# Patient Record
Sex: FEMALE | Race: WHITE
[De-identification: names, ages, dates, MRNs, and addresses within clinical notes are randomized per-mention and may not be internally consistent; named-entity substitution may affect disease eponyms.]

---

## 2020-03-02 ENCOUNTER — HOSPITAL ENCOUNTER (EMERGENCY)
Dept: HOSPITAL 88 - ER | Age: 65
Discharge: HOME | End: 2020-03-02
Payer: MEDICARE

## 2020-03-02 VITALS — HEIGHT: 65 IN | WEIGHT: 238 LBS | BODY MASS INDEX: 39.65 KG/M2

## 2020-03-02 DIAGNOSIS — R30.0: Primary | ICD-10-CM

## 2020-03-02 DIAGNOSIS — R10.32: ICD-10-CM

## 2020-03-02 LAB
ALBUMIN SERPL-MCNC: 3.9 G/DL (ref 3.5–5)
ALBUMIN/GLOB SERPL: 1.3 {RATIO} (ref 0.8–2)
ALP SERPL-CCNC: 68 IU/L (ref 40–150)
ALT SERPL-CCNC: 17 IU/L (ref 0–55)
ANION GAP SERPL CALC-SCNC: 12.1 MMOL/L (ref 8–16)
BACTERIA URNS QL MICRO: (no result) /HPF
BASOPHILS # BLD AUTO: 0 10*3/UL (ref 0–0.1)
BASOPHILS NFR BLD AUTO: 0.5 % (ref 0–1)
BILIRUB UR QL: NEGATIVE
BUN SERPL-MCNC: 23 MG/DL (ref 7–26)
BUN/CREAT SERPL: 21 (ref 6–25)
CALCIUM SERPL-MCNC: 9.7 MG/DL (ref 8.4–10.2)
CHLORIDE SERPL-SCNC: 105 MMOL/L (ref 98–107)
CLARITY UR: CLEAR
CO2 SERPL-SCNC: 26 MMOL/L (ref 22–29)
COLOR UR: YELLOW
DEPRECATED NEUTROPHILS # BLD AUTO: 4.4 10*3/UL (ref 2.1–6.9)
DEPRECATED RBC URNS MANUAL-ACNC: (no result) /HPF (ref 0–5)
EGFRCR SERPLBLD CKD-EPI 2021: 49 ML/MIN (ref 60–?)
EOSINOPHIL # BLD AUTO: 0.2 10*3/UL (ref 0–0.4)
EOSINOPHIL NFR BLD AUTO: 2.4 % (ref 0–6)
EPI CELLS URNS QL MICRO: (no result) /LPF
ERYTHROCYTE [DISTWIDTH] IN CORD BLOOD: 13.5 % (ref 11.7–14.4)
GLOBULIN PLAS-MCNC: 2.9 G/DL (ref 2.3–3.5)
GLUCOSE SERPLBLD-MCNC: 108 MG/DL (ref 74–118)
HCT VFR BLD AUTO: 41.6 % (ref 34.2–44.1)
HGB BLD-MCNC: 13.7 G/DL (ref 12–16)
KETONES UR QL STRIP.AUTO: NEGATIVE
LEUKOCYTE ESTERASE UR QL STRIP.AUTO: NEGATIVE
LYMPHOCYTES # BLD: 2.2 10*3/UL (ref 1–3.2)
LYMPHOCYTES NFR BLD AUTO: 29.8 % (ref 18–39.1)
MCH RBC QN AUTO: 28.9 PG (ref 28–32)
MCHC RBC AUTO-ENTMCNC: 32.9 G/DL (ref 31–35)
MCV RBC AUTO: 87.8 FL (ref 81–99)
MONOCYTES # BLD AUTO: 0.6 10*3/UL (ref 0.2–0.8)
MONOCYTES NFR BLD AUTO: 8.2 % (ref 4.4–11.3)
NEUTS SEG NFR BLD AUTO: 59 % (ref 38.7–80)
NITRITE UR QL STRIP.AUTO: NEGATIVE
PLATELET # BLD AUTO: 261 X10E3/UL (ref 140–360)
POTASSIUM SERPL-SCNC: 4.1 MMOL/L (ref 3.5–5.1)
PROT UR QL STRIP.AUTO: NEGATIVE
RBC # BLD AUTO: 4.74 X10E6/UL (ref 3.6–5.1)
SODIUM SERPL-SCNC: 139 MMOL/L (ref 136–145)
SP GR UR STRIP: 1.02 (ref 1.01–1.02)
UROBILINOGEN UR STRIP-MCNC: 0.2 MG/DL (ref 0.2–1)
WBC #/AREA URNS HPF: (no result) /HPF (ref 0–5)

## 2020-03-02 PROCEDURE — 74177 CT ABD & PELVIS W/CONTRAST: CPT

## 2020-03-02 PROCEDURE — 36415 COLL VENOUS BLD VENIPUNCTURE: CPT

## 2020-03-02 PROCEDURE — 83690 ASSAY OF LIPASE: CPT

## 2020-03-02 PROCEDURE — 80053 COMPREHEN METABOLIC PANEL: CPT

## 2020-03-02 PROCEDURE — 81001 URINALYSIS AUTO W/SCOPE: CPT

## 2020-03-02 PROCEDURE — 99284 EMERGENCY DEPT VISIT MOD MDM: CPT

## 2020-03-02 PROCEDURE — 85025 COMPLETE CBC W/AUTO DIFF WBC: CPT

## 2020-03-02 NOTE — XMS REPORT
Summary of Care

                             Created on: 2019



Gabbie Antoine

External Reference #: [id number]

: 1955

Sex: Female



Demographics







                          Address                   [id number]

[place]  [address]

 

                          Home Phone                [phone]

 

                          Preferred Language        English

 

                          Marital Status            

 

                          Hinduism Affiliation     CHR

 

                          Race                      White

 

                          Ethnic Group              Non-





Author







                          Author                    [facility]

 

                          Organization              [facility]

 

                          Address                   Unknown

 

                          Phone                     Unavailable







Support







                Name            Relationship    Address         Phone

 

                Kamryn Lazo    [facility]            , [place]            [phone]







Care Team Providers







                    Care Team Member Name    Role                Phone

 

                    Rachel Rodriguez    PCP                 [phone]







Reason for Visit

* 





 



                           Reason                    Comments

 

 



                                         YEAST INFECTION 









Encounter Details







                          Care Team                 Description



                     Date                Type                Department  

 

                                        



Sari Banks FNP



[address] [address]



[address]



[place] [address] [phone] (Fax)                      Vaginal irritation (Primary Dx)



                     2019          Office Visit        [facility]  



                                         [facility], [address], [address]  



                                         [address]  



                                         [place] [address] [phone]  







Allergies







                                        Comments



                 Active Allergy     Reactions       Severity        Noted Date 

 

                                        



Colon swells



                     Codeine             Swelling            2018 

 

                                        



"complete memory loss"



                     Gabapentin          Unknown - See       2018 



                                         comments   

 

                                        



Body shock



                     Ibuprofen           Other - See         2018 



                                         comments   

 

                                        



Body shock



                     Nsaids (Non-Steroidal     Other - See         2018 



                           Anti-Inflammatory Drug)     comments   



documented as of this encounter (statuses as of 2019)



Medications







                          End Date                  Status



              Medication     Sig          Dispensed     Refills      Start  



                                         Date  

 

                                                    Active



                 acyclovir 200 mg capsule     Take 200 mg      0                 



                           by mouth                  8  



                                         every 4     



                                         (four) hours.     

 

                                                    Active



                     cloniDINE 0.1 mg tablet     Take 0.1 mg         0   



                                         by mouth 2     



                                         (two) times     



                                         daily.     

 

                                                    Active



                 estradiol 1 mg tablet     Take 1 mg by      0                 



                           mouth daily.              8  

 

                                                    Active



                 hydroxychloroquine 200 mg     Take 200 mg      0                 



                     tablet              by mouth            8  



                                         daily.     

 

                                                    Active



                     lansoprazole 30 mg     Take 15 mg by       0   



                           capsule                   mouth daily.     

 

                                                    Active



                 lisinopril-hydrochlorothi     Take 20-25      0                 



                     azide 20-25 mg per tablet     tablets by          8  



                                         mouth daily.     

 

                                                    Active



                     pregabalin 50 mg capsule     Take 75 mg by       0   



                                         mouth daily.     

 

                                                    Active



                     glucosamine/methylsulfony     Take 1              0   



                           lmeth (GLUCOSAMINE MSM     TAB-CAP/M2 by     



                           ORAL)                     mouth daily.     

 

                                                    Active



                     fluocinonide 0.05 %     Apply  to           0   



                           ointment                  area(s) as     



                                         needed.     

 

                                                    Active



              estradiol (ESTRADIOL     Apply 1 Patch     4 Patch      5              



                     TRANSDERMAL PATCH) 0.05     to skin             8  



                           mg/24 hr patch            weekly.     

 

                                                    Active



              estradiol 1 mg tablet     Take 1 tablet     90 tablet     3            09/10/201  



                           by mouth                  8  



                                         daily.     



documented as of this encounter (statuses as of 2019)



Active Problems







 



                           Problem                   Noted Date

 

 



                           Obesity (BMI 30-39.9)     2018

 

 



                           Morbid obesity with body mass index of 40.0-49.9     2018

 

 



                           Cholecystitis             2018

 

 



                                         Overview:



                                         Added automatically from request for surgery [id number]



documented as of this encounter (statuses as of 2019)



Social History







                                        Date



                 Tobacco Use     Types           Packs/Day       Years Used 

 

                                         



                                         Never Smoker    

 

    



                                         Smokeless Tobacco: Never   



                                         Used   









                    Drinks/Week         oz/Week             Comments



                                         Alcohol Use   

 

                                                            rarely



                                         Yes   









 



                           Sex Assigned at Birth     Date Recorded

 

 



                                         Not on file 









                                        Industry



                           Job Start Date            Occupation 

 

                                        Not on file



                           Not on file               Not on file 









                                        Travel End



                           Travel History            Travel Start 

 





                                         No recent travel history available.



documented as of this encounter



Last Filed Vital Signs







                    Reading             Time Taken          Comments



                                         Vital Sign   

 

                    114/75              2019  3:44 PM CDT     



                                         Blood Pressure   

 

                    58                  2019  3:44 PM CDT     



                                         Pulse   

 

                    -                   -                    



                                         Temperature   

 

                    -                   -                    



                                         Respiratory Rate   

 

                    -                   -                    



                                         Oxygen Saturation   

 

                    -                   -                    



                                         Inhaled Oxygen   



                                         Concentration   

 

                    111.1 kg (245 lb)    2019  3:44 PM CDT     



                                         Weight   

 

                    165.1 cm (5' 5")    2019  3:44 PM CDT     



                                         Height   

 

                    40.77               2019  3:44 PM CDT     



                                         Body Mass Index   



documented in this encounter



Progress Notes

* Sari Banks, KYUNG - 2019  3:45 PM CDT



Formatting of this note might be different from the original.

Cc: 

Chief Complaint 

Patient presents with 

 YEAST INFECTION 



HPI

Gabbie Antoine is a 64 year old female  has a past medical history of Cholelithia
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
y of cervix.

She is here today for possible vaginal infection. About 2 weeks ago she used 3 d
ay monistat for vaginal irritation and states her symptoms improved. She is conc
erned because when she went to [facility] for a UTI she noticed on her la
b results it was positive for Gardnerella vaginal. She wants to make sure she do
es not have a current infection. 

Allergies

Gabbie is allergic to codeine; gabapentin; ibuprofen; and nsaids (non-steroidal a
nti-inflammatory drug).



Medications

Outpatient Medications Prior to Visit 

Medication Sig Dispense Refill 

 estradiol 1 mg tablet Take 1 tablet by mouth daily. 90 tablet 3 

 glucosamine/methylsulfonylmeth (GLUCOSAMINE MSM ORAL) Take 1 TAB-CAP/M2 by m
outh daily.   

 acyclovir 200 mg capsule Take 200 mg by mouth every 4 (four) hours.   

 cloniDINE 0.1 mg tablet Take 0.1 mg by mouth 2 (two) times daily.   

 estradiol 1 mg tablet Take 1 mg by mouth daily.   

 hydroxychloroquine 200 mg tablet Take 200 mg by mouth daily.   

 lansoprazole 30 mg capsule Take 15 mg by mouth daily.   

 lisinopril-hydrochlorothiazide 20-25 mg per tablet Take 20-25 tablets by 
th daily.   

 pregabalin 50 mg capsule Take 75 mg by mouth daily.   

 estradiol (ESTRADIOL TRANSDERMAL PATCH) 0.05 mg/24 hr patch Apply 1 Patch to
skin weekly. 4 Patch 5 

 fluocinonide 0.05 % ointment Apply  to area(s) as needed.   



No facility-administered medications prior to visit.  



Histories

Past Medical History: 

Diagnosis Date 

 Cholelithiasis  

 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  

 Fibromyalgia  

 Hypertension  

 Pap smear abnormality of cervix  

 reports had multiple cryotherapies 



Past Surgical History: 

Procedure Laterality Date 

 C-SEC+POSTPARTUM CARE,PREV C-SEC  ,  

 HYSTERECTOMY   

 LAPAROSCOPIC CHOLECYSTECTOMY N/A 2018 

 Surgeon: Lavell Carmona MD;  Location: [facility] 

 OPEN BLADDER SUSPENSION   

 OK ANESTH,KNEE AREA SURGERY   



Social History 



Socioeconomic History 

 Marital status:  

  Spouse name: Not on file 

 Number of children: Not on file 

 Years of education: Not on file 

 Highest education level: Not on file 

Occupational History 

 Not on file 

Social Needs 

 Financial resource strain: Not on file 

 Food insecurity: 

  Worry: Not on file 

  Inability: Not on file 

 Transportation needs: 

  Medical: Not on file 

  Non-medical: Not on file 

Tobacco Use 

 Smoking status: Never Smoker 

 Smokeless tobacco: Never Used 

Substance and Sexual Activity 

 Alcohol use: Yes 

  Comment: rarely 

 Drug use: No 

 Sexual activity: Not Currently 

  Birth control/protection: Surgical 

Lifestyle 

 Physical activity: 

  Days per week: Not on file 

  Minutes per session: Not on file 

 Stress: Not on file 

Relationships 

 Social connections: 

  Talks on phone: Not on file 

  Gets together: Not on file 

  Attends Synagogue service: Not on file 

  Active member of club or organization: Not on file 

  Attends meetings of clubs or organizations: Not on file 

  Relationship status: Not on file 

 Intimate partner violence: 

  Fear of current or ex partner: Not on file 

  Emotionally abused: Not on file 

  Physically abused: Not on file 

  Forced sexual activity: Not on file 

Other Topics Concern 

 Not on file 

Social History Narrative 

 Not on file 



Family History 

Problem Relation Age of Onset 

 No Significant Medical Problems Father  



Review of Systems 

Constitutional: Negative for chills, fatigue and fever. 

Gastrointestinal: Negative.  

Genitourinary: Negative for dysuria, urgency, hematuria, flank pain, decreased u
rine volume, vaginal bleeding, vaginal discharge, difficulty urinating, vaginal 
pain and pelvic pain. 

Skin: Negative.  



Vital Signs

/75  | Pulse 58  | Ht 5' 5" (1.651 m)  | Wt 245 lb (111.1 kg)  | BMI 40.77
kg/m 



Physical Exam 

Constitutional: She is oriented to person, place, and time. She appears well-dev
eloped and well-nourished. No distress. 

Abdominal: Soft. Bowel sounds are normal. She exhibits no mass. There is no guar
ding. 

Genitourinary: Pelvic exam was performed with patient supine. There is no rash, 
tenderness, lesion or injury on the right labia. There is no rash, tenderness, l
esion or injury on the left labia. No erythema, tenderness or bleeding in the va
ever. No foreign body in the vagina. No signs of injury around the vagina. Vagin
al discharge (very small amount of white discharge) found. 

Neurological: She is alert and oriented to person, place, and time. 

Skin: Skin is warm and dry. Capillary refill takes less than 2 seconds. 

Psychiatric: She has a normal mood and affect. Her behavior is normal. Judgment 
and thought content normal. 

Nursing note and vitals reviewed.



Assessment/Plan

Gabbie was seen today for yeast infection.



Diagnoses and all orders for this visit:



Vaginal irritation

-     GALV ONLY - VAGINAL PATHOGENS BY DNA PROBE; Future

-     GALV ONLY - VAGINAL PATHOGENS BY DNA PROBE



Will await results of affirm swab. She is to make appointment with Dr. Lang
for her refills of estradiol. She is due for WWE 19. 



Appropriate plan of care, desired health behaviors, goals, and medications discu
ssed with patient. 

 

Educational resources and self management tools provided, as applicable. Patient
verbalizes understanding. 

 

As necessary, prescribed mediations and potential adverse reactions/side effects
/medication interactions were discussed with the patient, and the patient will n
otify me if any occur. 

 

Follow up or report to ER if symptoms should progress or worsen.

 

The patient indicates understanding and agrees with POC. 

 

Barriers to adherence: None

 

Ability to manage care: Good

 

AVS printed and given to patient. 

KYUNG Quijano  2019  4:55 PM



This visit did not involve counseling and coordination that comprised more than 
50% of the visit time. 



Electronically signed by Sari Banks FNP at 2019  5:04 PM CDT

documented in this encounter



Plan of Treatment







                          Care Team                 Description



                     Date                Type                Specialty  

 

                                        



Brina Samayoa MD



[address] [address] [address]



[phone] (Fax)                       



                     2019          Office Visit        Obstetrics & Gynecology  









                                        Date/Time



                 Name            Type            Priority        Associated Diagnoses 

 

                                        2019  4:44 PM CDT



                 GALV ONLY - VAGINAL     LAB             Routine         Vaginal irritation 



                                         PATHOGENS BY DNA PROBE    









                                        Order Schedule



                 Name            Type            Priority        Associated Diagnoses 

 

                                        Expected: 2019, Expires: 2020



                 GALV ONLY - VAGINAL     LAB             Routine         Vaginal irritation 



                                         PATHOGENS BY DNA PROBE    









   



                 Health Maintenance     Due Date        Last Done       Comments

 

   



                           HEPATITIS C (HCV) SCREEN     1955  

 

   



                           DTaP,Tdap,and Td Vaccines     01/15/1974  



                                         (1 - Tdap)   

 

   



                           MAMMOGRAM                 01/15/1995  

 

   



                           COLONOSCOPY               01/15/2005  

 

   



                           Zoster Recombinant        01/15/2005  



                                         Vaccine (SHINGRIX) (1 of   



                                         2)   

 

   



                           INFLUENZA VACCINE         2019  

 

   



                     PAP SMEAR           2021 

 

   



                     PNEUMOCOCCAL 0-64 YEARS     Aged Out            No longer eligible based



                           COMBINED SERIES           on patient's age to



                                         complete this topic



documented as of this encounter



Results

Not on filedocumented in this encounter



Visit Diagnoses











                                         Diagnosis

 





                                         Vaginal irritation - Primary



                                         Unspecified noninflammatory disorder of vagina



documented in this encounter



Insurance







                                        Type



            Payer      Benefit     Subscriber ID     Effective     Phone      Address 



                           Plan /                    Dates   



                                         Group     

 

                                        O



            [facility]     [id number]     2018-P     [phone]     [address] 



                 [facility]          [id number] 



                           [place] [address] 









     



            Guarantor Name     Account     Relation to     Date of     Phone      Billing Address



                     Type                Patient             Birth  

 

     



            Gabbie Antoine     Personal/F     Self       1955     [phone]     [address]               (Home)              [id number]



                                         [place] [address]



documented as of this encounter

## 2020-03-02 NOTE — XMS REPORT
Summary of Care

                             Created on: 2019



Gabbie Antoine

External Reference #: [id number]

: 1955

Sex: Female



Demographics







                          Address                   [address] [place]  [address]

 

                          Home Phone                [phone]

 

                          Preferred Language        English

 

                          Marital Status            

 

                          Muslim Affiliation     [facility]R

 

                          Race                      White

 

                          Ethnic Group              Non-





Author







                          Author                    [facility]

 

                          Organization              [facility]

 

                          Address                   Unknown

 

                          Phone                     Unavailable







Support







                Name            Relationship    Address         Phone

 

                Kamryn Lazo    [facility]            , [place]            [phone]







Care Team Providers







                    Care Team Member Name    Role                Phone

 

                    Felicia Rodrigueztracierigobertoor    PCP                 [phone]







Encounter Details







                          Care Team                 Description



                     Date                Type                Department  

 

                                        



Doctor Unassigned, Harleigh



[address] [place] [address]                      



                     2019          Orders Only         [facility]  



                                         [address] [place] [address]  







Allergies







                                        Comments



                 Active Allergy     Reactions       Severity        Noted Date 

 

                                        



Colon swells



                     Codeine             Swelling            2018 

 

                                        



"complete memory loss"



                     Gabapentin          Unknown - See       2018 



                                         comments   

 

                                        



Body shock



                     Ibuprofen           Other - See         2018 



                                         comments   

 

                                        



Body shock



                     Nsaids (Non-Steroidal     Other - See         2018 



                           Anti-Inflammatory Drug)     comments   



documented as of this encounter (statuses as of 2019)



Medications







                          End Date                  Status



              Medication     Sig          Dispensed     Refills      Start  



                                         Date  

 

                                                    Active



                 acyclovir 200 mg capsule     Take 200 mg      0                 



                           by mouth                  8  



                                         every 4     



                                         (four) hours.     

 

                                                    Active



                     cloniDINE 0.1 mg tablet     Take 0.1 mg         0   



                                         by mouth 2     



                                         (two) times     



                                         daily.     

 

                                                    Active



                 estradiol 1 mg tablet     Take 1 mg by      0                 



                           mouth daily.              8  

 

                                                    Active



                 hydroxychloroquine 200 mg     Take 200 mg      0                 



                     tablet              by mouth            8  



                                         daily.     

 

                                                    Active



                     lansoprazole 30 mg     Take 15 mg by       0   



                           capsule                   mouth daily.     

 

                                                    Active



                 lisinopril-hydrochlorothi     Take 20-25      0                 



                     azide 20-25 mg per tablet     tablets by          8  



                                         mouth daily.     

 

                                                    Active



                     pregabalin 50 mg capsule     Take 75 mg by       0   



                                         mouth daily.     

 

                                                    Active



                     glucosamine/methylsulfony     Take 1              0   



                           lmeth (GLUCOSAMINE MSM     TAB-CAP/M2 by     



                           ORAL)                     mouth daily.     

 

                                                    Active



                     fluocinonide 0.05 %     Apply  to           0   



                           ointment                  area(s) as     



                                         needed.     

 

                                                    Active



              estradiol (ESTRADIOL     Apply 1 Patch     4 Patch      5              



                     TRANSDERMAL PATCH) 0.05     to skin             8  



                           mg/24 hr patch            weekly.     

 

                                                    Active



              estradiol 1 mg tablet     Take 1 tablet     90 tablet     3            09/10/201  



                           by mouth                  8  



                                         daily.     



documented as of this encounter (statuses as of 2019)



Active Problems







 



                           Problem                   Noted Date

 

 



                           Obesity (BMI 30-39.9)     2018

 

 



                           Morbid obesity with body mass index of 40.0-49.9     2018

 

 



                           Cholecystitis             2018

 

 



                                         Overview:



                                         Added automatically from request for surgery [id number]



documented as of this encounter (statuses as of 2019)



Social History







                                        Date



                 Tobacco Use     Types           Packs/Day       Years Used 

 

                                         



                                         Never Smoker    

 

    



                                         Smokeless Tobacco: Never   



                                         Used   









                    Drinks/Week         oz/Week             Comments



                                         Alcohol Use   

 

                                                            rarely



                                         Yes   









 



                           Sex Assigned at Birth     Date Recorded

 

 



                                         Not on file 









                                        Industry



                           Job Start Date            Occupation 

 

                                        Not on file



                           Not on file               Not on file 









                                        Travel End



                           Travel History            Travel Start 

 





                                         No recent travel history available.



documented as of this encounter



Last Filed Vital Signs

Not on filedocumented in this encounter



Plan of Treatment







                          Care Team                 Description



                     Date                Type                Specialty  

 

                                        



Sari Banks FNP



[address] [facility] [address]



[facility] [address]



[place] [address]



[phone] ([phone]                       



                     2019          Office Visit        Obstetrics & Gynecology  

 

                                        



Brina Samayoa MD



[address] [address]  [address]



[place], [place] [address] [phone] (Fax)                       



                     2019          Office Visit        Obstetrics & Gynecology  









   



                 Health Maintenance     Due Date        Last Done       Comments

 

   



                           HEPATITIS C (HCV) SCREEN     1955  

 

   



                           DTaP,Tdap,and Td Vaccines     01/15/1974  



                                         (1 - Tdap)   

 

   



                           MAMMOGRAM                 01/15/1995  

 

   



                           COLONOSCOPY               01/15/2005  

 

   



                           Zoster Recombinant        01/15/2005  



                                         Vaccine (SHINGRIX) (1 of   



                                         2)   

 

   



                           INFLUENZA VACCINE         2019  

 

   



                     PAP SMEAR           2021 

 

   



                     PNEUMOCOCCAL 0-64 YEARS     Aged Out            No longer eligible based



                           COMBINED SERIES           on patient's age to



                                         complete this topic



documented as of this encounter



Procedures







                                        Comments



                 Procedure Name     Priority        Date/Time       Associated Diagnosis 

 

                                         



                     ASSIGNMENT OF BENEFITS     Routine             2019  



                                         3:31 PM CDT  



documented in this encounter



Results

Not on filedocumented in this encounter



Insurance







                                        Type



            Payer      Benefit     Subscriber ID     Effective     Phone      Address 



                           Plan /                    Dates   



                                         Group     

 

                                        HMO



            [facility]     [id number]     2018-P     [phone]     [address] 



                 [facility]          [id number] 



                           [place] [address] 



documented as of this encounter

## 2020-03-02 NOTE — XMS REPORT
Patient Summary Document

                             Created on: 2020



LARRY ROSEN

External Reference #: [id number]

: 1955

Sex: Female



Demographics







                          Address                   [address] [place]  [address]

 

                          Home Phone                [phone]

 

                          Preferred Language        Unknown

 

                          Marital Status            Unknown

 

                          Temple Affiliation     Unknown

 

                          Race                      Unknown

 

                          Ethnic Group              Unknown





Author







                          Author                    [facility]

 

                          [facility]

 

                          Address                   Unknown

 

                          Phone                     Unavailable







Care Team Providers







                    Care Team Member Name    Role                Phone

 

                          Unavailable               Unavailable







Problems

This patient has no known problems.



Allergies, Adverse Reactions, Alerts

This patient has no known allergies or adverse reactions.



Medications

This patient has no known medications.



Encounters







             Start Date/Time    End Date/Time    Encounter Type    Admission Type    Attending [facility] Facility       Care Department     Encounter ID

 

        2020 00:00:00    2020 00:00:00    Outpatient                    [facility]     [id number]

 

        2019 09:55:16    2019 09:55:16    Outpatient                    [facility]     [id number]

 

        2019 09:23:02    2019 09:23:02    Outpatient                    [facility]     [id number]

 

        2019 00:00:00    2019 00:00:00    Outpatient                    [facility]     [id number]

 

        2019 00:00:00    2019 00:00:00    Outpatient                    [facility]     [id number]

 

        2019-10-30 00:00:00    2019-10-30 00:00:00    Outpatient                    [facility]     [id number]

 

        2019-10-24 10:29:47    2019-10-24 10:29:47    Outpatient                    [facility]     [id number]

 

        2019-10-17 00:00:00    2019-10-17 00:00:00    Outpatient                    [facility]     [id number]

 

        2019 12:53:19    2019 12:53:19    Outpatient                    [facility]     [id number]

 

        2019 10:42:18    2019 10:42:18    Outpatient                    [facility]     [id number]

 

        2019 00:00:00    2019 00:00:00    Outpatient                    [facility]     [id number]

 

        2019 00:00:00    2019 00:00:00    Outpatient                    [facility]     [id number]

 

        2019 09:50:31    2019 09:50:31    Outpatient                    [facility]     [id number]

 

        2019 00:00:00    2019 00:00:00    Outpatient                    [facility]     [id number]

 

        2019 15:06:58    2019 15:06:58    Outpatient                    [facility]     [id number]

 

        2019 11:29:09    2019 11:29:09    Outpatient                    [facility]     [id number]

 

        2019 10:16:56    2019 10:16:56    Outpatient                    [facility]     [id number]

 

        2019 00:00:00    2019 00:00:00    Outpatient                    [facility]     [id number]

 

        2019 09:20:36    2019 09:20:36    Outpatient                    [facility]     [id number]

 

        2019 00:00:00    2019 00:00:00    Outpatient                    [facility]     [id number]

 

        2019 10:39:01    2019 10:39:01    Outpatient                    [facility]     [id number]

 

        2019 09:39:56    2019 09:39:56    Outpatient                    [facility]     [id number]

 

        2019 00:00:00    2019 00:00:00    Outpatient                    [facility]     [id number]

 

        2018 00:00:00    2018 00:00:00    Outpatient                    [facility]     [id number]

 

        2018 09:19:47    2018 09:19:47    Outpatient                    [facility]     [id number]

 

        2018-10-01 00:00:00    2018-10-01 00:00:00    Outpatient                    [facility]     [id number]

 

        2018-10-01 00:00:00    2018-10-01 00:00:00    Outpatient                    [facility]     [id number]

 

        2018 00:00:00    2018 00:00:00    Outpatient                    [facility]     [id number]

 

        2018 00:00:00    2018 00:00:00    Outpatient                    [facility]     [id number]

 

        2018 00:00:00    2018 00:00:00    Outpatient                    [facility]     [id number]

 

        2018 00:00:00    2018 00:00:00    Outpatient                    [facility]     [id number]

 

        2018 10:39:00    2018 10:39:00    Outpatient                    [facility]     [id number]

 

        2018 09:16:03    2018 09:16:03    Outpatient                    [facility]     [id number]

 

        2018-08-15 10:56:46    2018-08-15 10:56:46    Outpatient                    [facility]     [id number]

 

        2018-08-15 10:13:40    2018-08-15 10:13:40    Outpatient                    [facility]     [id number]

 

        2018 14:10:09    2018 14:10:09    Outpatient                    [facility]     [id number]

 

        2018-07-10 00:00:00    2018-07-10 00:00:00    Outpatient                    [facility]     [id number]

 

        2018 10:30:09    2018 10:30:09    Outpatient                    [facility]     [id number]

 

        2018 08:44:06    2018 08:44:06    Outpatient                    [facility]     [id number]

 

        2018 00:00:00    2018 00:00:00    Outpatient                    [facility]     [id number]

 

        2017-11-10 00:00:00    2017-11-10 00:00:00    Outpatient                    [facility]     [id number]

 

        2017 00:00:00    2017 00:00:00    Outpatient                    [facility]     [id number]

 

        2017-10-26 00:00:00    2017-10-26 00:00:00    Outpatient                    [facility]     [id number]

 

        2017-10-20 00:00:00    2017-10-20 00:00:00    Outpatient                    [facility]     [id number]

 

        2017 09:37:30    2017 09:37:30    Outpatient                    [facility]     [id number]

 

        2017 10:08:54    2017 10:08:54    Outpatient                    [facility]     [id number]

 

        2017 08:57:27    2017 08:57:27    Outpatient                    [facility]     [id number]

 

        2017 00:00:00    2017 00:00:00    Outpatient                    [facility]     [id number]

 

        2017 15:35:19    2017 15:35:19    Outpatient                    [facility]     [id number]

 

        2017 13:28:19    2017 13:28:19    Outpatient                    [facility]     [id number]

 

        2017 12:33:39    2017 12:33:39    Outpatient                    [facility]     [id number]

 

        2017 10:00:33    2017 10:00:33    Outpatient                    [facility]     [id number]

 

        2017 08:47:07    2017 08:47:07    Outpatient                    [facility]     [id number]

 

        2017 07:14:45    2017 07:14:45    Outpatient                    [facility]     [id number]

 

        2017 14:54:05    2017 14:54:05    Outpatient                    [facility]     [id number]

 

        2017 15:19:31    2017 15:19:31    Outpatient                    [facility]     [id number]

 

        2017 15:04:44    2017 15:04:44    Outpatient                    [facility]     [id number]

 

        2017 13:56:58    2017 13:56:58    Outpatient                    [facility]     [id number]

 

        2017-05-10 14:37:01    2017-05-10 14:37:01    Outpatient                    [facility]     [id number]

## 2020-03-02 NOTE — XMS REPORT
Summary of Care

                             Created on: 2019



Gabbie Antoine

External Reference #: [id number]

: 1955

Sex: Female



Demographics







                          Address                   [address]

[place]  [address]

 

                          Home Phone                [phone]

 

                          Preferred Language        English

 

                          Marital Status            

 

                          Yazidi Affiliation     CHR

 

                          Race                      White

 

                          Ethnic Group              Non-





Author







                          Author                    [facility]

 

                          Organization              [facility]

 

                          Address                   Unknown

 

                          Phone                     Unavailable







Support







                Name            Relationship    Address         Phone

 

                Kamryn Lazo    [facility]            , [place]            [phone]







Care Team Providers







                    Care Team Member Name    Role                Phone

 

                    Rachel Rodriguez    PCP                 [phone]







Reason for Visit

* 





 



                           Reason                    Comments

 

 



                                         YEAST INFECTION 









Encounter Details







                          Care Team                 Description



                     Date                Type                Department  

 

                                        



Sari Banks FNP



[address]



[address]



[place] [address] [phone] (Fax)                      Vaginal irritation (Primary Dx)



                     2019          Office Visit        [facility]  



                                         [facility], [address], [address]  



                                         [address]  



                                         [place] [address] [phone]  







Allergies







                                        Comments



                 Active Allergy     Reactions       Severity        Noted Date 

 

                                        



Colon swells



                     Codeine             Swelling            2018 

 

                                        



"complete memory loss"



                     Gabapentin          Unknown - See       2018 



                                         comments   

 

                                        



Body shock



                     Ibuprofen           Other - See         2018 



                                         comments   

 

                                        



Body shock



                     Nsaids (Non-Steroidal     Other - See         2018 



                           Anti-Inflammatory Drug)     comments   



documented as of this encounter (statuses as of 2019)



Medications







                          End Date                  Status



              Medication     Sig          Dispensed     Refills      Start  



                                         Date  

 

                                                    Active



                 acyclovir 200 mg capsule     Take 200 mg      0                 



                           by mouth                  8  



                                         every 4     



                                         (four) hours.     

 

                                                    Active



                     cloniDINE 0.1 mg tablet     Take 0.1 mg         0   



                                         by mouth 2     



                                         (two) times     



                                         daily.     

 

                                                    Active



                 estradiol 1 mg tablet     Take 1 mg by      0                 



                           mouth daily.              8  

 

                                                    Active



                 hydroxychloroquine 200 mg     Take 200 mg      0                 



                     tablet              by mouth            8  



                                         daily.     

 

                                                    Active



                     lansoprazole 30 mg     Take 15 mg by       0   



                           capsule                   mouth daily.     

 

                                                    Active



                 lisinopril-hydrochlorothi     Take 20-25      0                 



                     azide 20-25 mg per tablet     tablets by          8  



                                         mouth daily.     

 

                                                    Active



                     pregabalin 50 mg capsule     Take 75 mg by       0   



                                         mouth daily.     

 

                                                    Active



                     glucosamine/methylsulfony     Take 1              0   



                           lmeth (GLUCOSAMINE MSM     TAB-CAP/M2 by     



                           ORAL)                     mouth daily.     

 

                                                    Active



                     fluocinonide 0.05 %     Apply  to           0   



                           ointment                  area(s) as     



                                         needed.     

 

                                                    Active



              estradiol (ESTRADIOL     Apply 1 Patch     4 Patch      5              



                     TRANSDERMAL PATCH) 0.05     to skin             8  



                           mg/24 hr patch            weekly.     

 

                                                    Active



              estradiol 1 mg tablet     Take 1 tablet     90 tablet     3            09/10/201  



                           by mouth                  8  



                                         daily.     



documented as of this encounter (statuses as of 2019)



Active Problems







 



                           Problem                   Noted Date

 

 



                           Obesity (BMI 30-39.9)     2018

 

 



                           Morbid obesity with body mass index of 40.0-49.9     2018

 

 



                           Cholecystitis             2018

 

 



                                         Overview:



                                         Added automatically from request for surgery [id number]



documented as of this encounter (statuses as of 2019)



Social History







                                        Date



                 Tobacco Use     Types           Packs/Day       Years Used 

 

                                         



                                         Never Smoker    

 

    



                                         Smokeless Tobacco: Never   



                                         Used   









                    Drinks/Week         oz/Week             Comments



                                         Alcohol Use   

 

                                                            rarely



                                         Yes   









 



                           Sex Assigned at Birth     Date Recorded

 

 



                                         Not on file 









                                        Industry



                           Job Start Date            Occupation 

 

                                        Not on file



                           Not on file               Not on file 









                                        Travel End



                           Travel History            Travel Start 

 





                                         No recent travel history available.



documented as of this encounter



Last Filed Vital Signs







                    Reading             Time Taken          Comments



                                         Vital Sign   

 

                    114/75              2019  3:44 PM CDT     



                                         Blood Pressure   

 

                    58                  2019  3:44 PM CDT     



                                         Pulse   

 

                    -                   -                    



                                         Temperature   

 

                    -                   -                    



                                         Respiratory Rate   

 

                    -                   -                    



                                         Oxygen Saturation   

 

                    -                   -                    



                                         Inhaled Oxygen   



                                         Concentration   

 

                    111.1 kg (245 lb)    2019  3:44 PM CDT     



                                         Weight   

 

                    165.1 cm (5' 5")    2019  3:44 PM CDT     



                                         Height   

 

                    40.77               2019  3:44 PM CDT     



                                         Body Mass Index   



documented in this encounter



Progress Notes

* Sari Banks, KYUNG - 2019  3:45 PM CDT



Formatting of this note might be different from the original.

Cc: 

Chief Complaint 

Patient presents with 

 YEAST INFECTION 



HPI

Gabbie Antoine is a 64 year old female  has a past medical history of Cholelithia
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
y of cervix.

She is here today for possible vaginal infection. About 2 weeks ago she used 3 d
ay monistat for vaginal irritation and states her symptoms improved. She is conc
erned because when she went to [facility] for a UTI she noticed on her la
b results it was positive for Gardnerella vaginal. She wants to make sure she do
es not have a current infection. 

Allergies

Gabbie is allergic to codeine; gabapentin; ibuprofen; and nsaids (non-steroidal a
nti-inflammatory drug).



Medications

Outpatient Medications Prior to Visit 

Medication Sig Dispense Refill 

 estradiol 1 mg tablet Take 1 tablet by mouth daily. 90 tablet 3 

 glucosamine/methylsulfonylmeth (GLUCOSAMINE MSM ORAL) Take 1 TAB-CAP/M2 by m
outh daily.   

 acyclovir 200 mg capsule Take 200 mg by mouth every 4 (four) hours.   

 cloniDINE 0.1 mg tablet Take 0.1 mg by mouth 2 (two) times daily.   

 estradiol 1 mg tablet Take 1 mg by mouth daily.   

 hydroxychloroquine 200 mg tablet Take 200 mg by mouth daily.   

 lansoprazole 30 mg capsule Take 15 mg by mouth daily.   

 lisinopril-hydrochlorothiazide 20-25 mg per tablet Take 20-25 tablets by 
th daily.   

 pregabalin 50 mg capsule Take 75 mg by mouth daily.   

 estradiol (ESTRADIOL TRANSDERMAL PATCH) 0.05 mg/24 hr patch Apply 1 Patch to
skin weekly. 4 Patch 5 

 fluocinonide 0.05 % ointment Apply  to area(s) as needed.   



No facility-administered medications prior to visit.  



Histories

Past Medical History: 

Diagnosis Date 

 Cholelithiasis  

 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  

 Fibromyalgia  

 Hypertension  

 Pap smear abnormality of cervix  

 reports had multiple cryotherapies 



Past Surgical History: 

Procedure Laterality Date 

 C-SEC+POSTPARTUM CARE,PREV C-SEC  ,  

 HYSTERECTOMY   

 LAPAROSCOPIC CHOLECYSTECTOMY N/A 2018 

 Surgeon: Lavell Carmona MD;  Location: [facility] 

 OPEN BLADDER SUSPENSION   

 MO ANESTH,KNEE AREA SURGERY   



Social History 



Socioeconomic History 

 Marital status:  

  Spouse name: Not on file 

 Number of children: Not on file 

 Years of education: Not on file 

 Highest education level: Not on file 

Occupational History 

 Not on file 

Social Needs 

 Financial resource strain: Not on file 

 Food insecurity: 

  Worry: Not on file 

  Inability: Not on file 

 Transportation needs: 

  Medical: Not on file 

  Non-medical: Not on file 

Tobacco Use 

 Smoking status: Never Smoker 

 Smokeless tobacco: Never Used 

Substance and Sexual Activity 

 Alcohol use: Yes 

  Comment: rarely 

 Drug use: No 

 Sexual activity: Not Currently 

  Birth control/protection: Surgical 

Lifestyle 

 Physical activity: 

  Days per week: Not on file 

  Minutes per session: Not on file 

 Stress: Not on file 

Relationships 

 Social connections: 

  Talks on phone: Not on file 

  Gets together: Not on file 

  Attends Alevism service: Not on file 

  Active member of club or organization: Not on file 

  Attends meetings of clubs or organizations: Not on file 

  Relationship status: Not on file 

 Intimate partner violence: 

  Fear of current or ex partner: Not on file 

  Emotionally abused: Not on file 

  Physically abused: Not on file 

  Forced sexual activity: Not on file 

Other Topics Concern 

 Not on file 

Social History Narrative 

 Not on file 



Family History 

Problem Relation Age of Onset 

 No Significant Medical Problems Father  



Review of Systems 

Constitutional: Negative for chills, fatigue and fever. 

Gastrointestinal: Negative.  

Genitourinary: Negative for dysuria, urgency, hematuria, flank pain, decreased u
rine volume, vaginal bleeding, vaginal discharge, difficulty urinating, vaginal 
pain and pelvic pain. 

Skin: Negative.  



Vital Signs

/75  | Pulse 58  | Ht 5' 5" (1.651 m)  | Wt 245 lb (111.1 kg)  | BMI 40.77
kg/m 



Physical Exam 

Constitutional: She is oriented to person, place, and time. She appears well-dev
eloped and well-nourished. No distress. 

Abdominal: Soft. Bowel sounds are normal. She exhibits no mass. There is no guar
ding. 

Genitourinary: Pelvic exam was performed with patient supine. There is no rash, 
tenderness, lesion or injury on the right labia. There is no rash, tenderness, l
esion or injury on the left labia. No erythema, tenderness or bleeding in the va
ever. No foreign body in the vagina. No signs of injury around the vagina. Vagin
al discharge (very small amount of white discharge) found. 

Neurological: She is alert and oriented to person, place, and time. 

Skin: Skin is warm and dry. Capillary refill takes less than 2 seconds. 

Psychiatric: She has a normal mood and affect. Her behavior is normal. Judgment 
and thought content normal. 

Nursing note and vitals reviewed.



Assessment/Plan

Gabbie was seen today for yeast infection.



Diagnoses and all orders for this visit:



Vaginal irritation

-     GALV ONLY - VAGINAL PATHOGENS BY DNA PROBE; Future

-     GALV ONLY - VAGINAL PATHOGENS BY DNA PROBE



Will await results of affirm swab. She is to make appointment with Dr. Lang
for her refills of estradiol. She is due for WWE 19. 



Appropriate plan of care, desired health behaviors, goals, and medications discu
ssed with patient. 

 

Educational resources and self management tools provided, as applicable. Patient
verbalizes understanding. 

 

As necessary, prescribed mediations and potential adverse reactions/side effects
/medication interactions were discussed with the patient, and the patient will n
otify me if any occur. 

 

Follow up or report to ER if symptoms should progress or worsen.

 

The patient indicates understanding and agrees with POC. 

 

Barriers to adherence: None

 

Ability to manage care: Good

 

AVS printed and given to patient. 

KYUNG Quijano  2019  4:55 PM



This visit did not involve counseling and coordination that comprised more than 
50% of the visit time. 



Electronically signed by Sari Banks FNP at 2019  5:04 PM CDT

documented in this encounter



Plan of Treatment







                          Care Team                 Description



                     Date                Type                Specialty  

 

                                        



Brina Samayoa MD



[address] [address]  [address]



[place], [place] [id number] [phone] (Fax)                       



                     2019          Office Visit        Obstetrics & Gynecology  









   



                 Health Maintenance     Due Date        Last Done       Comments

 

   



                           HEPATITIS C (HCV) SCREEN     1955  

 

   



                           DTaP,Tdap,and Td Vaccines     01/15/1974  



                                         (1 - Tdap)   

 

   



                           MAMMOGRAM                 01/15/1995  

 

   



                           COLONOSCOPY               01/15/2005  

 

   



                           Zoster Recombinant        01/15/2005  



                                         Vaccine (SHINGRIX) (1 of   



                                         2)   

 

   



                           INFLUENZA VACCINE         2019  

 

   



                     PAP SMEAR           2021 

 

   



                     PNEUMOCOCCAL 0-64 YEARS     Aged Out            No longer eligible based



                           COMBINED SERIES           on patient's age to



                                         complete this topic



documented as of this encounter



Procedures







                                        Comments



                 Procedure Name     Priority        Date/Time       Associated Diagnosis 

 

                                        



Results for this procedure are in the results section.



                 GALV ONLY - VAGINAL     Routine         2019      Vaginal irritation 



                           PATHOGENS BY DNA PROBE      4:44 PM CDT  



documented in this encounter



Results

* GALV ONLY - VAGINAL PATHOGENS BY DNA PROBE (2019  4:44 PM CDT)





    



              Component     Value        Ref Range     Performed At     Pathologist



                                         Signature

 

    



                 Trichomonas     Negative        Negative        [facility] 



                           vaginalis                 SERVICES 

 

    



                 Gardnerella     Positive (A)Comment: The     Negative        [facility] 



                     vaginalis           presence of Gardnerella      SERVICES 



                                         vaginalis although suggestive,   



                                         is not diagnostic of Bacterial   



                                         Vaginosis.   

 

    



                 Candida species     Negative        Negative        [facility] 



                                         SERVICES 













                                         Specimen

 





                                         Fluid - VAGINA









   



                 Performing Organization     Address         City/State/Zipcode     Phone Number

 

   



                 [facility]     CLIA:  [id number][id number]     [place] [address]     [phone]





                                         [facility]  





documented in this encounter



Visit Diagnoses











                                         Diagnosis

 





                                         Vaginal irritation - Primary



                                         Unspecified noninflammatory disorder of vagina



documented in this encounter



Insurance







                                        Type



            Payer      Benefit     Subscriber ID     Effective     Phone      Address 



                           Plan /                    Dates   



                                         Group     

 

                                        [facility]     [id number]     2018-P     [phone]     [address] 



                 [facility]          [id number] 



                           [place] [address] 









     



            Guarantor Name     Account     Relation to     Date of     Phone      Billing Address



                     Type                Patient             Birth  

 

     



            Gabbie Antoine     Personal/F     Self       1955     [phone]     [address]               (Home)              [id number]



                                         [place] [address]



documented as of this encounter

## 2020-03-02 NOTE — DIAGNOSTIC IMAGING REPORT
CT of the abdomen and pelvis



History: Left lower quadrant pain



Comparison: None available.

                                                                       

Technique: Multidetector CT scanning of the abdomen and pelvis was performed

from the level of the lung bases to the inferior pubic ramus, with intravenous

administration of non-ionic contrast and with oral contrast.



DOSE REDUCTION: The examination was performed according to departmental

dose-optimization program which includes automated exposure control, adjustment

of the mA and/or kV according to patient size and/or use of iterative

reconstruction technique.                                                      

         





Discussion: 

The lung bases are clear.



No focal hepatic lesions are identified. The gallbladder is surgically absent.

There is no intrahepatic or extrahepatic biliary dilatation.



Spleen is within normal limits.



The left adrenal gland is normal. The right adrenal gland contains a 1.7 cm

nodule.



The pancreas is homogeneous in attenuation. There is no pancreatic ductal

dilatation.



The kidneys are normal in size and enhance symmetrically. There is no

hydroureteronephrosis bilaterally. No renal calculi are identified. Incidental

mode is made of a retroaortic left renal vein, a normal anatomic variant.



The stomach, small, and large bowel are nondistended. There is no evidence of

obstruction. No bowel wall thickening is appreciated. The appendix is normal.

There are scattered colonic diverticula without evidence of acute

diverticulitis.



There is no free intraperitoneal air or ascites.



The abdominal aorta is of normal course and caliber.



The uterus is surgically absent.



The urinary bladder is decompressed.



There is a small fat containing right periumbilical hernia.



No acute osseous abnormalities are identified. Multilevel degenerative changes

of the thoracolumbar spine.





IMPRESSION:



1. Colonic diverticulosis without evidence of acute diverticulitis.

2. Status post cholecystectomy and hysterectomy.

3. 1.7 cm right adrenal nodule. This is a probable adenoma. Recommend one-year

follow-up adrenal washout CT. If stable at 1 year no further follow-up imaging

is recommended.



Signed by: Stephen E Dreyer, MD on 3/2/2020 9:01 AM

## 2020-03-02 NOTE — XMS REPORT
Summary of Care

                             Created on: 2019



Gabbie Antoine

External Reference #: [id number]

: 1955

Sex: Female



Demographics







                          Address                   [address]

[place]  [address]

 

                          Home Phone                [phone]

 

                          Preferred Language        English

 

                          Marital Status            

 

                          Anabaptist Affiliation     CHR

 

                          Race                      White

 

                          Ethnic Group              Non-





Author







                          Author                    [facility]

 

                          Organization              [facility]

 

                          Address                   Unknown

 

                          Phone                     Unavailable







Support







                Name            Relationship    Address         Phone

 

                Kamryn Lazo    [facility]            , [place]            [phone]







Care Team Providers







                    Care Team Member Name    Role                Phone

 

                    Rachel Rodriguez    PCP                 [phone]







Reason for Visit

* 





 



                           Reason                    Comments

 

 



                                         Orders 









Encounter Details







                          Care Team                 Description



                     Date                Type                Department  

 

                                        



Sari Banks FNP



[address]



[address]



[place] [address] [phone] (Fax)                      Orders



                     2019          Telephone           [facility]  



                                         Care- [address], [address]  



                                         [address]  



                                         [place] [address] [phone]  







Allergies







                                        Comments



                 Active Allergy     Reactions       Severity        Noted Date 

 

                                        



Colon swells



                     Codeine             Swelling            2018 

 

                                        



"complete memory loss"



                     Gabapentin          Unknown - See       2018 



                                         comments   

 

                                        



Body shock



                     Ibuprofen           Other - See         2018 



                                         comments   

 

                                        



Body shock



                     Nsaids (Non-Steroidal     Other - See         2018 



                           Anti-Inflammatory Drug)     comments   



documented as of this encounter (statuses as of 2019)



Medications







                          End Date                  Status



              Medication     Sig          Dispensed     Refills      Start  



                                         Date  

 

                                                    Active



                 acyclovir 200 mg capsule     Take 200 mg      0                 



                           by mouth                  8  



                                         every 4     



                                         (four) hours.     

 

                                                    Active



                     cloniDINE 0.1 mg tablet     Take 0.1 mg         0   



                                         by mouth 2     



                                         (two) times     



                                         daily.     

 

                                                    Active



                 estradiol 1 mg tablet     Take 1 mg by      0                 



                           mouth daily.              8  

 

                                                    Active



                 hydroxychloroquine 200 mg     Take 200 mg      0                 



                     tablet              by mouth            8  



                                         daily.     

 

                                                    Active



                     lansoprazole 30 mg     Take 15 mg by       0   



                           capsule                   mouth daily.     

 

                                                    Active



                 lisinopril-hydrochlorothi     Take 20-25      0                 



                     azide 20-25 mg per tablet     tablets by          8  



                                         mouth daily.     

 

                                                    Active



                     pregabalin 50 mg capsule     Take 75 mg by       0   



                                         mouth daily.     

 

                                                    Active



                     glucosamine/methylsulfony     Take 1              0   



                           lmeth (GLUCOSAMINE MSM     TAB-CAP/M2 by     



                           ORAL)                     mouth daily.     

 

                                                    Active



                     fluocinonide 0.05 %     Apply  to           0   



                           ointment                  area(s) as     



                                         needed.     

 

                                                    Active



              estradiol (ESTRADIOL     Apply 1 Patch     4 Patch      5              



                     TRANSDERMAL PATCH) 0.05     to skin             8  



                           mg/24 hr patch            weekly.     

 

                                                    Active



              estradiol 1 mg tablet     Take 1 tablet     90 tablet     3            09/10/201  



                           by mouth                  8  



                                         daily.     

 

                          2019                Active



              metroNIDAZOLE 500 mg     Take 1 tablet     14 tablet     0              



                     tabletIndications:     by mouth 2          9  



                           Bacterial vaginosis       (two) times     



                                         daily for 7     



                                         days.     



documented as of this encounter (statuses as of 2019)



Active Problems







 



                           Problem                   Noted Date

 

 



                           Obesity (BMI 30-39.9)     2018

 

 



                           Morbid obesity with body mass index of 40.0-49.9     2018

 

 



                           Cholecystitis             2018

 

 



                                         Overview:



                                         Added automatically from request for surgery [id number]



documented as of this encounter (statuses as of 2019)



Social History







                                        Date



                 Tobacco Use     Types           Packs/Day       Years Used 

 

                                         



                                         Never Smoker    

 

    



                                         Smokeless Tobacco: Never   



                                         Used   









                    Drinks/Week         oz/Week             Comments



                                         Alcohol Use   

 

                                                            rarely



                                         Yes   









 



                           Sex Assigned at Birth     Date Recorded

 

 



                                         Not on file 









                                        Industry



                           Job Start Date            Occupation 

 

                                        Not on file



                           Not on file               Not on file 









                                        Travel End



                           Travel History            Travel Start 

 





                                         No recent travel history available.



documented as of this encounter



Last Filed Vital Signs

Not on filedocumented in this encounter



Plan of Treatment







                          Care Team                 Description



                     Date                Type                Specialty  

 

                                        



Brina Samayoa MD



[address] [address]  [address]



[place], [place] [address] [phone] (Fax)                       



                     2019          Office Visit        Obstetrics & Gynecology  









   



                 Health Maintenance     Due Date        Last Done       Comments

 

   



                           HEPATITIS C (HCV) SCREEN     1955  

 

   



                           DTaP,Tdap,and Td Vaccines     01/15/1974  



                                         (1 - Tdap)   

 

   



                           MAMMOGRAM                 01/15/1995  

 

   



                           COLONOSCOPY               01/15/2005  

 

   



                           Zoster Recombinant        01/15/2005  



                                         Vaccine (SHINGRIX) (1 of   



                                         2)   

 

   



                           INFLUENZA VACCINE         2019  

 

   



                     PAP SMEAR           2021 

 

   



                     PNEUMOCOCCAL 0-64 YEARS     Aged Out            No longer eligible based



                           COMBINED SERIES           on patient's age to



                                         complete this topic



documented as of this encounter



Results

Not on filedocumented in this encounter



Visit Diagnoses











                                         Diagnosis

 





                                         Bacterial vaginosis - Primary



                                         Vaginitis and vulvovaginitis, unspecified



documented in this encounter



Insurance







                                        Type



            Payer      Benefit     Subscriber ID     Effective     Phone      Address 



                           Plan /                    Dates   



                                         Group     

 

                                        [facility]     [id number]     2018-P     [phone]     [address] 



                 [facility] [place]          [id number] 



                           [place] [address] 



documented as of this encounter

## 2020-03-02 NOTE — XMS REPORT
Summary of Care

                             Created on: 2019



Gabbie Antoine

External Reference #: [id number]

: 1955

Sex: Female



Demographics







                          Address                   [id number]

[place]  [address]

 

                          Home Phone                [phone]

 

                          Preferred Language        English

 

                          Marital Status            

 

                          Caodaism Affiliation     [facility]R

 

                          Race                      White

 

                          Ethnic Group              Non-





Author







                          Author                    [facility]

 

                          Organization              [facility]

 

                          Address                   Unknown

 

                          Phone                     Unavailable







Support







                Name            Relationship    Address         Phone

 

                Kamryn Lazo    [facility]            , [place]            [phone]







Care Team Providers







                    Care Team Member Name    Role                Phone

 

                    Rachel Rodriguez    PCP                 [phone]







Reason for Visit

* 





 



                           Reason                    Comments

 

 



                                         YEAST INFECTION 









Encounter Details







                          Care Team                 Description



                     Date                Type                Department  

 

                                        



Sari Banks FNP



[address]



[address]



[place] [address] [phone] (Fax)                      Vaginal irritation (Primary Dx)



                     2019          Office Visit        [facility]  



                                         [facility], [address], [address]  



                                         [address]  



                                         [place] [address] [phone]  







Allergies







                                        Comments



                 Active Allergy     Reactions       Severity        Noted Date 

 

                                        



Colon swells



                     Codeine             Swelling            2018 

 

                                        



"complete memory loss"



                     Gabapentin          Unknown - See       2018 



                                         comments   

 

                                        



Body shock



                     Ibuprofen           Other - See         2018 



                                         comments   

 

                                        



Body shock



                     Nsaids (Non-Steroidal     Other - See         2018 



                           Anti-Inflammatory Drug)     comments   



documented as of this encounter (statuses as of 2019)



Medications







                          End Date                  Status



              Medication     Sig          Dispensed     Refills      Start  



                                         Date  

 

                                                    Active



                 acyclovir 200 mg capsule     Take 200 mg      0                 



                           by mouth                  8  



                                         every 4     



                                         (four) hours.     

 

                                                    Active



                     cloniDINE 0.1 mg tablet     Take 0.1 mg         0   



                                         by mouth 2     



                                         (two) times     



                                         daily.     

 

                                                    Active



                 estradiol 1 mg tablet     Take 1 mg by      0                 



                           mouth daily.              8  

 

                                                    Active



                 hydroxychloroquine 200 mg     Take 200 mg      0                 



                     tablet              by mouth            8  



                                         daily.     

 

                                                    Active



                     lansoprazole 30 mg     Take 15 mg by       0   



                           capsule                   mouth daily.     

 

                                                    Active



                 lisinopril-hydrochlorothi     Take 20-25      0                 



                     azide 20-25 mg per tablet     tablets by          8  



                                         mouth daily.     

 

                                                    Active



                     pregabalin 50 mg capsule     Take 75 mg by       0   



                                         mouth daily.     

 

                                                    Active



                     glucosamine/methylsulfony     Take 1              0   



                           lmeth (GLUCOSAMINE MSM     TAB-CAP/M2 by     



                           ORAL)                     mouth daily.     

 

                                                    Active



                     fluocinonide 0.05 %     Apply  to           0   



                           ointment                  area(s) as     



                                         needed.     

 

                                                    Active



              estradiol (ESTRADIOL     Apply 1 Patch     4 Patch      5              



                     TRANSDERMAL PATCH) 0.05     to skin             8  



                           mg/24 hr patch            weekly.     

 

                                                    Active



              estradiol 1 mg tablet     Take 1 tablet     90 tablet     3            09/10/201  



                           by mouth                  8  



                                         daily.     



documented as of this encounter (statuses as of 2019)



Active Problems







 



                           Problem                   Noted Date

 

 



                           Obesity (BMI 30-39.9)     2018

 

 



                           Morbid obesity with body mass index of 40.0-49.9     2018

 

 



                           Cholecystitis             2018

 

 



                                         Overview:



                                         Added automatically from request for surgery [id number]



documented as of this encounter (statuses as of 2019)



Social History







                                        Date



                 Tobacco Use     Types           Packs/Day       Years Used 

 

                                         



                                         Never Smoker    

 

    



                                         Smokeless Tobacco: Never   



                                         Used   









                    Drinks/Week         oz/Week             Comments



                                         Alcohol Use   

 

                                                            rarely



                                         Yes   









 



                           Sex Assigned at Birth     Date Recorded

 

 



                                         Not on file 









                                        Industry



                           Job Start Date            Occupation 

 

                                        Not on file



                           Not on file               Not on file 









                                        Travel End



                           Travel History            Travel Start 

 





                                         No recent travel history available.



documented as of this encounter



Last Filed Vital Signs







                    Reading             Time Taken          Comments



                                         Vital Sign   

 

                    114/75              2019  3:44 PM CDT     



                                         Blood Pressure   

 

                    58                  2019  3:44 PM CDT     



                                         Pulse   

 

                    -                   -                    



                                         Temperature   

 

                    -                   -                    



                                         Respiratory Rate   

 

                    -                   -                    



                                         Oxygen Saturation   

 

                    -                   -                    



                                         Inhaled Oxygen   



                                         Concentration   

 

                    111.1 kg (245 lb)    2019  3:44 PM CDT     



                                         Weight   

 

                    165.1 cm (5' 5")    2019  3:44 PM CDT     



                                         Height   

 

                    40.77               2019  3:44 PM CDT     



                                         Body Mass Index   



documented in this encounter



Progress Notes

* Sari Banks, KYUNG - 2019  3:45 PM CDT



Formatting of this note might be different from the original.

Cc: 

Chief Complaint 

Patient presents with 

 YEAST INFECTION 



HPI

Gabbie Antoine is a 64 year old female  has a past medical history of Cholelithia
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
y of cervix.

She is here today for possible vaginal infection. About 2 weeks ago she used 3 d
ay monistat for vaginal irritation and states her symptoms improved. She is conc
erned because when she went to [facility] for a UTI she noticed on her la
b results it was positive for Gardnerella vaginal. She wants to make sure she do
es not have a current infection. 

Allergies

Gabbie is allergic to codeine; gabapentin; ibuprofen; and nsaids (non-steroidal a
nti-inflammatory drug).



Medications

Outpatient Medications Prior to Visit 

Medication Sig Dispense Refill 

 estradiol 1 mg tablet Take 1 tablet by mouth daily. 90 tablet 3 

 glucosamine/methylsulfonylmeth (GLUCOSAMINE MSM ORAL) Take 1 TAB-CAP/M2 by m
outh daily.   

 acyclovir 200 mg capsule Take 200 mg by mouth every 4 (four) hours.   

 cloniDINE 0.1 mg tablet Take 0.1 mg by mouth 2 (two) times daily.   

 estradiol 1 mg tablet Take 1 mg by mouth daily.   

 hydroxychloroquine 200 mg tablet Take 200 mg by mouth daily.   

 lansoprazole 30 mg capsule Take 15 mg by mouth daily.   

 lisinopril-hydrochlorothiazide 20-25 mg per tablet Take 20-25 tablets by 
th daily.   

 pregabalin 50 mg capsule Take 75 mg by mouth daily.   

 estradiol (ESTRADIOL TRANSDERMAL PATCH) 0.05 mg/24 hr patch Apply 1 Patch to
skin weekly. 4 Patch 5 

 fluocinonide 0.05 % ointment Apply  to area(s) as needed.   



No facility-administered medications prior to visit.  



Histories

Past Medical History: 

Diagnosis Date 

 Cholelithiasis  

 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  

 Fibromyalgia  

 Hypertension  

 Pap smear abnormality of cervix  

 reports had multiple cryotherapies 



Past Surgical History: 

Procedure Laterality Date 

 C-SEC+POSTPARTUM CARE,PREV C-SEC  ,  

 HYSTERECTOMY   

 LAPAROSCOPIC CHOLECYSTECTOMY N/A 2018 

 Surgeon: Lvaell Carmona MD;  Location: [facility] 

 OPEN BLADDER SUSPENSION   

 [place] ANESTH,KNEE AREA SURGERY   



Social History 



Socioeconomic History 

 Marital status:  

  Spouse name: Not on file 

 Number of children: Not on file 

 Years of education: Not on file 

 Highest education level: Not on file 

Occupational History 

 Not on file 

Social Needs 

 Financial resource strain: Not on file 

 Food insecurity: 

  Worry: Not on file 

  Inability: Not on file 

 Transportation needs: 

  Medical: Not on file 

  Non-medical: Not on file 

Tobacco Use 

 Smoking status: Never Smoker 

 Smokeless tobacco: Never Used 

Substance and Sexual Activity 

 Alcohol use: Yes 

  Comment: rarely 

 Drug use: No 

 Sexual activity: Not Currently 

  Birth control/protection: Surgical 

Lifestyle 

 Physical activity: 

  Days per week: Not on file 

  Minutes per session: Not on file 

 Stress: Not on file 

Relationships 

 Social connections: 

  Talks on phone: Not on file 

  Gets together: Not on file 

  Attends Orthodox service: Not on file 

  Active member of club or organization: Not on file 

  Attends meetings of clubs or organizations: Not on file 

  Relationship status: Not on file 

 Intimate partner violence: 

  Fear of current or ex partner: Not on file 

  Emotionally abused: Not on file 

  Physically abused: Not on file 

  Forced sexual activity: Not on file 

Other Topics Concern 

 Not on file 

Social History Narrative 

 Not on file 



Family History 

Problem Relation Age of Onset 

 No Significant Medical Problems Father  



Review of Systems 

Constitutional: Negative for chills, fatigue and fever. 

Gastrointestinal: Negative.  

Genitourinary: Negative for dysuria, urgency, hematuria, flank pain, decreased u
rine volume, vaginal bleeding, vaginal discharge, difficulty urinating, vaginal 
pain and pelvic pain. 

Skin: Negative.  



Vital Signs

/75  | Pulse 58  | Ht 5' 5" (1.651 m)  | Wt 245 lb (111.1 kg)  | BMI 40.77
kg/m 



Physical Exam 

Constitutional: She is oriented to person, place, and time. She appears well-dev
eloped and well-nourished. No distress. 

Abdominal: Soft. Bowel sounds are normal. She exhibits no mass. There is no guar
ding. 

Genitourinary: Pelvic exam was performed with patient supine. There is no rash, 
tenderness, lesion or injury on the right labia. There is no rash, tenderness, l
esion or injury on the left labia. No erythema, tenderness or bleeding in the va
ever. No foreign body in the vagina. No signs of injury around the vagina. Vagin
al discharge (very small amount of white discharge) found. 

Neurological: She is alert and oriented to person, place, and time. 

Skin: Skin is warm and dry. Capillary refill takes less than 2 seconds. 

Psychiatric: She has a normal mood and affect. Her behavior is normal. Judgment 
and thought content normal. 

Nursing note and vitals reviewed.



Assessment/Plan

Gabbie was seen today for yeast infection.



Diagnoses and all orders for this visit:



Vaginal irritation

-     GALV ONLY - VAGINAL PATHOGENS BY DNA PROBE; Future

-     GALV ONLY - VAGINAL PATHOGENS BY DNA PROBE



Will await results of affirm swab. She is to make appointment with Dr. Lang
for her refills of estradiol. She is due for WWE 19. 



Appropriate plan of care, desired health behaviors, goals, and medications discu
ssed with patient. 

 

Educational resources and self management tools provided, as applicable. Patient
verbalizes understanding. 

 

As necessary, prescribed mediations and potential adverse reactions/side effects
/medication interactions were discussed with the patient, and the patient will n
otify me if any occur. 

 

Follow up or report to ER if symptoms should progress or worsen.

 

The patient indicates understanding and agrees with POC. 

 

Barriers to adherence: None

 

Ability to manage care: Good

 

AVS printed and given to patient. 

KYUNG Quijano  2019  4:55 PM



This visit did not involve counseling and coordination that comprised more than 
50% of the visit time. 



Electronically signed by Sari Banks FNP at 2019  5:04 PM CDT

documented in this encounter



Plan of Treatment







                          Care Team                 Description



                     Date                Type                Specialty  

 

                                        



Brina Samayoa MD



[address] [address] [address]



[phone] (Fax)                       



                     2019          Office Visit        Obstetrics & Gynecology  









                                        Date/Time



                 Name            Type            Priority        Associated Diagnoses 

 

                                        2019  4:44 PM CDT



                 GALV ONLY - VAGINAL     LAB             Routine         Vaginal irritation 



                                         PATHOGENS BY DNA PROBE    









                                        Order Schedule



                 Name            Type            Priority        Associated Diagnoses 

 

                                        Expected: 2019, Expires: 2020



                 GALV ONLY - VAGINAL     LAB             Routine         Vaginal irritation 



                                         PATHOGENS BY DNA PROBE    









   



                 Health Maintenance     Due Date        Last Done       Comments

 

   



                           HEPATITIS C (HCV) SCREEN     1955  

 

   



                           DTaP,Tdap,and Td Vaccines     01/15/1974  



                                         (1 - Tdap)   

 

   



                           MAMMOGRAM                 01/15/1995  

 

   



                           COLONOSCOPY               01/15/2005  

 

   



                           Zoster Recombinant        01/15/2005  



                                         Vaccine (SHINGRIX) (1 of   



                                         2)   

 

   



                           INFLUENZA VACCINE         2019  

 

   



                     PAP SMEAR           2021 

 

   



                     PNEUMOCOCCAL 0-64 YEARS     Aged Out            No longer eligible based



                           COMBINED SERIES           on patient's age to



                                         complete this topic



documented as of this encounter



Results

Not on filedocumented in this encounter



Visit Diagnoses











                                         Diagnosis

 





                                         Vaginal irritation - Primary



                                         Unspecified noninflammatory disorder of vagina



documented in this encounter



Insurance







                                        Type



            Payer      Benefit     Subscriber ID     Effective     Phone      Address 



                           Plan /                    Dates   



                                         Group     

 

                                        O



            [facility]     [id number]     2018-P     [phone]     [address] 



                 [facility]          [id number] 



                           [place] [address] 









     



            Guarantor Name     Account     Relation to     Date of     Phone      Billing Address



                     Type                Patient             Birth  

 

     



            Gabbie Antoine     Personal/F     Self       1955     [phone]     [address]               (Home)              [id number]



                                         [place] [address]



documented as of this encounter